# Patient Record
Sex: FEMALE | Race: WHITE | Employment: STUDENT | URBAN - METROPOLITAN AREA
[De-identification: names, ages, dates, MRNs, and addresses within clinical notes are randomized per-mention and may not be internally consistent; named-entity substitution may affect disease eponyms.]

---
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
## 2020-08-20 ENCOUNTER — LAB ENCOUNTER (OUTPATIENT)
Dept: LAB | Facility: HOSPITAL | Age: 20
End: 2020-08-20
Attending: FAMILY MEDICINE
Payer: COMMERCIAL

## 2020-08-20 ENCOUNTER — TELEMEDICINE (OUTPATIENT)
Dept: FAMILY MEDICINE CLINIC | Facility: CLINIC | Age: 20
End: 2020-08-20

## 2020-08-20 DIAGNOSIS — R06.02 SOB (SHORTNESS OF BREATH): ICD-10-CM

## 2020-08-20 DIAGNOSIS — Z20.822 CLOSE EXPOSURE TO COVID-19 VIRUS: Primary | ICD-10-CM

## 2020-08-20 DIAGNOSIS — R09.89 RUNNY NOSE: ICD-10-CM

## 2020-08-20 DIAGNOSIS — R51.9 HEADACHE AROUND THE EYES: ICD-10-CM

## 2020-08-20 DIAGNOSIS — R05.9 COUGH: ICD-10-CM

## 2020-08-20 DIAGNOSIS — Z20.822 CLOSE EXPOSURE TO COVID-19 VIRUS: ICD-10-CM

## 2020-08-20 PROCEDURE — 99214 OFFICE O/P EST MOD 30 MIN: CPT | Performed by: FAMILY MEDICINE

## 2020-08-20 NOTE — PROGRESS NOTES
Video Visit- Dorota   This is a telemedicine visit with live, interactive video and audio.      Yung Vicente understands and accepts financial responsibility for any deductible, co-insurance and/or co-pays as Future    2. Cough  - SARS-COV-2 BY PCR (); Future    3. SOB (shortness of breath)  - SARS-COV-2 BY PCR (); Future    4. Runny nose  - SARS-COV-2 BY PCR (); Future    5. Headache around the eyes  - SARS-COV-2 BY PCR ();  Future

## 2020-08-21 LAB — SARS-COV-2 RNA RESP QL NAA+PROBE: NOT DETECTED

## 2020-08-27 ENCOUNTER — VIRTUAL PHONE E/M (OUTPATIENT)
Dept: FAMILY MEDICINE CLINIC | Facility: CLINIC | Age: 20
End: 2020-08-27
Payer: COMMERCIAL

## 2020-08-27 DIAGNOSIS — R05.9 COUGH: Primary | ICD-10-CM

## 2020-08-27 DIAGNOSIS — Z20.822 CLOSE EXPOSURE TO COVID-19 VIRUS: ICD-10-CM

## 2020-08-27 PROCEDURE — 99213 OFFICE O/P EST LOW 20 MIN: CPT | Performed by: FAMILY MEDICINE

## 2020-08-27 NOTE — PROGRESS NOTES
Virtual Telephone Check-In    [place] verbally consents to a Virtual/Telephone Check-In visit on  08/27/20. Patient understands and accepts financial responsibility for any deductible, co-insurance and/or co-pays associated with this service.     Du There are limitations of this visit as physical examination was limited. Appropriate medical decision-making and tests are ordered as detailed in the plan of care above.

## 2020-12-21 ENCOUNTER — OFFICE VISIT (OUTPATIENT)
Dept: FAMILY MEDICINE CLINIC | Facility: CLINIC | Age: 20
End: 2020-12-21
Payer: COMMERCIAL

## 2020-12-21 VITALS
HEIGHT: 62 IN | SYSTOLIC BLOOD PRESSURE: 100 MMHG | WEIGHT: 181 LBS | RESPIRATION RATE: 16 BRPM | BODY MASS INDEX: 33.31 KG/M2 | OXYGEN SATURATION: 99 % | TEMPERATURE: 98 F | DIASTOLIC BLOOD PRESSURE: 60 MMHG | HEART RATE: 62 BPM

## 2020-12-21 DIAGNOSIS — Z28.21 INFLUENZA VACCINATION DECLINED BY PATIENT: ICD-10-CM

## 2020-12-21 DIAGNOSIS — Z79.899 MEDICATION MANAGEMENT: ICD-10-CM

## 2020-12-21 DIAGNOSIS — Z00.00 ROUTINE MEDICAL EXAM: Primary | ICD-10-CM

## 2020-12-21 DIAGNOSIS — I49.9 IRREGULAR HEART BEAT: ICD-10-CM

## 2020-12-21 DIAGNOSIS — N92.0 MENORRHAGIA WITH REGULAR CYCLE: ICD-10-CM

## 2020-12-21 DIAGNOSIS — N94.6 DYSMENORRHEA: ICD-10-CM

## 2020-12-21 PROCEDURE — 87591 N.GONORRHOEAE DNA AMP PROB: CPT | Performed by: FAMILY MEDICINE

## 2020-12-21 PROCEDURE — 3008F BODY MASS INDEX DOCD: CPT | Performed by: FAMILY MEDICINE

## 2020-12-21 PROCEDURE — 3078F DIAST BP <80 MM HG: CPT | Performed by: FAMILY MEDICINE

## 2020-12-21 PROCEDURE — 3074F SYST BP LT 130 MM HG: CPT | Performed by: FAMILY MEDICINE

## 2020-12-21 PROCEDURE — 87491 CHLMYD TRACH DNA AMP PROBE: CPT | Performed by: FAMILY MEDICINE

## 2020-12-21 PROCEDURE — 99214 OFFICE O/P EST MOD 30 MIN: CPT | Performed by: FAMILY MEDICINE

## 2020-12-21 PROCEDURE — 99395 PREV VISIT EST AGE 18-39: CPT | Performed by: FAMILY MEDICINE

## 2020-12-21 PROCEDURE — 93000 ELECTROCARDIOGRAM COMPLETE: CPT | Performed by: FAMILY MEDICINE

## 2020-12-21 RX ORDER — ACETAZOLAMIDE 125 MG/1
125 TABLET ORAL
COMMUNITY
Start: 2020-12-15

## 2020-12-21 RX ORDER — NABUMETONE 500 MG/1
1 TABLET, FILM COATED ORAL 2 TIMES DAILY
COMMUNITY
Start: 2020-12-11 | End: 2020-12-21

## 2020-12-21 RX ORDER — NORGESTIMATE AND ETHINYL ESTRADIOL 0.25-0.035
1 KIT ORAL DAILY
Qty: 84 TABLET | Refills: 3 | Status: SHIPPED | OUTPATIENT
Start: 2020-12-21

## 2020-12-21 NOTE — PROGRESS NOTES
Patient presents with:  Physical  Immunization/Injection: Influenza vaccine declined by patient today      HPI:  Patrick Laurent is a 21year old female here today for preventative visit. Imms- up to date with tdap, will discuss flu shot.  Afraid of needl SR 24 Hr, Take 1 capsule by mouth nightly.  , Disp: , Rfl: 1    •  Rizatriptan Benzoate 10 MG Oral Tab, 1 pill at migraine onset. Repeat in 2 hours if necessary. Limit 2 doses/day, 2 days/week. Do not take within 24 hrs from other triptans. , Disp: , Rfl: file        Emotionally abused: Not on file        Physically abused: Not on file        Forced sexual activity: Not on file    Other Topics      Concerns:        Not on file    Social History Narrative      Not on file    Family History   Problem Relation EKG for comparison. ASSESSMENT/PLAN:    1. Routine medical exam    2. Influenza vaccination declined by patient  - FLULAVAL INFLUENZA VACCINE QUAD PRESERVATIVE FREE 0.5 ML    3. Dysmenorrhea  - CHLAMYDIA/GONOCOCCUS, AVNI;  Future  - Norgestimate-Eth Est

## 2020-12-29 ENCOUNTER — TELEMEDICINE (OUTPATIENT)
Dept: FAMILY MEDICINE CLINIC | Facility: CLINIC | Age: 20
End: 2020-12-29
Payer: COMMERCIAL

## 2020-12-29 DIAGNOSIS — Z20.822 CLOSE EXPOSURE TO COVID-19 VIRUS: Primary | ICD-10-CM

## 2020-12-29 PROCEDURE — 99213 OFFICE O/P EST LOW 20 MIN: CPT | Performed by: FAMILY MEDICINE

## 2020-12-29 NOTE — PROGRESS NOTES
Video Visit- Dorota   This is a telemedicine visit with live, interactive video and audio.      Gi Leblanc understands and accepts financial responsibility for any deductible, co-insurance and/or co-pays as not/obese  MUSCULOSKELETAL: Sitting upright. NEUROLOGIC:  Cranial nerves 2-12 grossly intact. PSYCHIATRIC:  Normal mood, affect, and hygiene. Assessment/Plan:  1.  Close exposure to COVID-19 virus  - 2019 NOVEL CORONAVIRUS SARS-COV-2 BY PCR([facility]);

## 2020-12-30 ENCOUNTER — LAB ENCOUNTER (OUTPATIENT)
Dept: LAB | Age: 20
End: 2020-12-30
Attending: FAMILY MEDICINE
Payer: COMMERCIAL

## 2020-12-30 DIAGNOSIS — Z20.822 CLOSE EXPOSURE TO COVID-19 VIRUS: ICD-10-CM

## 2021-01-02 LAB — SARS-COV-2 BY PCR: NOT DETECTED

## 2021-01-07 ENCOUNTER — TELEMEDICINE (OUTPATIENT)
Dept: FAMILY MEDICINE CLINIC | Facility: CLINIC | Age: 21
End: 2021-01-07
Payer: COMMERCIAL

## 2021-01-07 DIAGNOSIS — Z20.822 CLOSE EXPOSURE TO COVID-19 VIRUS: Primary | ICD-10-CM

## 2021-01-07 PROCEDURE — 99212 OFFICE O/P EST SF 10 MIN: CPT | Performed by: FAMILY MEDICINE

## 2021-01-07 NOTE — PROGRESS NOTES
Phone Visit- Dorota Fraire  This is a telemedicine visit with live audio as pt couldn't hear me on the video call.      Shell Ashley understands and accepts financial responsibility for any deductible, co-insurance Speaking in full sentences comfortably, normal work of breathing  PSYCHIATRIC:  Normal mood, affect, and hygiene. Assessment/Plan:  1.  Close exposure to COVID-19 virus   -ok to end quarantine from her house, but will still need to stay away from her d

## 2021-03-13 DIAGNOSIS — Z23 NEED FOR VACCINATION: ICD-10-CM

## 2021-06-12 ENCOUNTER — HOSPITAL ENCOUNTER (EMERGENCY)
Age: 21
Discharge: HOME | End: 2021-06-12
Attending: EMERGENCY MEDICINE
Payer: COMMERCIAL

## 2021-06-12 VITALS
RESPIRATION RATE: 16 BRPM | WEIGHT: 170 LBS | HEART RATE: 82 BPM | TEMPERATURE: 98 F | SYSTOLIC BLOOD PRESSURE: 123 MMHG | OXYGEN SATURATION: 98 % | DIASTOLIC BLOOD PRESSURE: 82 MMHG | BODY MASS INDEX: 31.28 KG/M2 | HEIGHT: 62 IN

## 2021-06-12 DIAGNOSIS — L50.9 HIVES: Primary | ICD-10-CM

## 2021-06-12 PROCEDURE — 99283 EMERGENCY DEPT VISIT LOW MDM: CPT

## 2021-06-12 RX ORDER — PREDNISONE 20 MG/1
60 TABLET ORAL ONCE
Status: COMPLETED | OUTPATIENT
Start: 2021-06-12 | End: 2021-06-12

## 2021-06-12 RX ORDER — PREDNISONE 20 MG/1
40 TABLET ORAL DAILY
Qty: 10 TABLET | Refills: 0 | Status: SHIPPED | OUTPATIENT
Start: 2021-06-12 | End: 2021-06-17

## 2021-06-12 RX ORDER — EPINEPHRINE 0.3 MG/.3ML
0.3 INJECTION SUBCUTANEOUS
Qty: 1 EACH | Refills: 0 | Status: SHIPPED | OUTPATIENT
Start: 2021-06-12 | End: 2021-07-12

## 2021-06-12 NOTE — ED INITIAL ASSESSMENT (HPI)
Broke out in rash/hives 4 days ago, very itchy, taking Benadryl 50mg last dose 90 min ago, not helping, denies shortness of breath, no known allergens

## 2021-06-12 NOTE — ED PROVIDER NOTES
Patient Seen in: THE [facility] In [place]      History   Patient presents with:  Rash Skin Problem    Stated Complaint: RASH x4 DAYS.     HPI/Subjective:   HPI    41-year-old female presents for evaluation of itchy red rash for the past lower back, flexor creases of arms  Neurologic: No focal neurologic deficits. Normal speech pattern  Musculoskeletal: No tenderness or deformity noted.           ED Course   Labs Reviewed - No data to display              MDM      Patient will be given a p

## 2021-06-15 ENCOUNTER — OFFICE VISIT (OUTPATIENT)
Dept: FAMILY MEDICINE CLINIC | Facility: CLINIC | Age: 21
End: 2021-06-15
Payer: COMMERCIAL

## 2021-06-15 VITALS
DIASTOLIC BLOOD PRESSURE: 72 MMHG | BODY MASS INDEX: 31.28 KG/M2 | HEART RATE: 77 BPM | SYSTOLIC BLOOD PRESSURE: 122 MMHG | TEMPERATURE: 98 F | WEIGHT: 170 LBS | RESPIRATION RATE: 16 BRPM | OXYGEN SATURATION: 99 % | HEIGHT: 62 IN

## 2021-06-15 DIAGNOSIS — R09.89 THROAT TIGHTNESS: ICD-10-CM

## 2021-06-15 DIAGNOSIS — L50.9 HIVES: Primary | ICD-10-CM

## 2021-06-15 PROCEDURE — 3074F SYST BP LT 130 MM HG: CPT | Performed by: FAMILY MEDICINE

## 2021-06-15 PROCEDURE — 3008F BODY MASS INDEX DOCD: CPT | Performed by: FAMILY MEDICINE

## 2021-06-15 PROCEDURE — 3078F DIAST BP <80 MM HG: CPT | Performed by: FAMILY MEDICINE

## 2021-06-15 PROCEDURE — 99214 OFFICE O/P EST MOD 30 MIN: CPT | Performed by: FAMILY MEDICINE

## 2021-06-15 RX ORDER — GALCANEZUMAB 120 MG/ML
120 INJECTION, SOLUTION SUBCUTANEOUS
COMMUNITY
Start: 2021-06-10

## 2021-06-15 RX ORDER — NABUMETONE 750 MG/1
TABLET, FILM COATED ORAL
COMMUNITY
Start: 2021-03-18 | End: 2021-06-15 | Stop reason: ALTCHOICE

## 2021-06-15 RX ORDER — DIPHENHYDRAMINE HCL 25 MG
75 TABLET ORAL
COMMUNITY

## 2021-06-15 RX ORDER — PREDNISONE 20 MG/1
TABLET ORAL
Qty: 32 TABLET | Refills: 0 | Status: SHIPPED | OUTPATIENT
Start: 2021-06-15 | End: 2021-06-29

## 2021-06-15 NOTE — PROGRESS NOTES
[facility] Visit Note  6/15/2021      Subjective:      Patient ID: Shell Ashley is a 24year old female.     Chief Complaint:  Patient presents with:  Rash: breaking out itchy red hive like rash all over her body since Wed last week, went to the E [facility]ives  - ALLERGY - INTERNAL  - predniSONE 20 MG Oral Tab; Take 3 tablets (60 mg total) by mouth daily for 7 days, THEN 2 tablets (40 mg total) daily for 4 days, THEN 1 tablet (20 mg total) daily for 3 days. Dispense: 32 tablet;  Refill: 0  - triamcinolone

## 2022-05-29 NOTE — ED AVS SNAPSHOT
Roz Friedman   MRN: [id number]    Department:  [facility] in [place]   Date of Visit:  7/7/2018           Disclosure     Insurance plans vary and the physician(s) referred by the ER may not be covered by your plan.  Please contact yo tell this physician (or your personal doctor if your instructions are to return to your personal doctor) about any new or lasting problems. The primary care or specialist physician will see patients referred from the [facility] Emergency Department.  Arthor Bernheim No

## 2023-01-26 ENCOUNTER — OFFICE VISIT (OUTPATIENT)
Dept: FAMILY MEDICINE CLINIC | Facility: CLINIC | Age: 23
End: 2023-01-26
Payer: COMMERCIAL

## 2023-01-26 VITALS
RESPIRATION RATE: 16 BRPM | OXYGEN SATURATION: 98 % | WEIGHT: 145 LBS | DIASTOLIC BLOOD PRESSURE: 70 MMHG | SYSTOLIC BLOOD PRESSURE: 112 MMHG | TEMPERATURE: 98 F | BODY MASS INDEX: 26.68 KG/M2 | HEIGHT: 62 IN | HEART RATE: 74 BPM

## 2023-01-26 DIAGNOSIS — N63.11 BREAST LUMP ON RIGHT SIDE AT 11 O'CLOCK POSITION: Primary | ICD-10-CM

## 2023-01-26 DIAGNOSIS — J06.9 VIRAL URI: ICD-10-CM

## 2023-01-26 DIAGNOSIS — N63.13 BREAST LUMP ON RIGHT SIDE AT 7 O'CLOCK POSITION: ICD-10-CM

## 2023-01-26 PROCEDURE — 3074F SYST BP LT 130 MM HG: CPT | Performed by: FAMILY MEDICINE

## 2023-01-26 PROCEDURE — 99213 OFFICE O/P EST LOW 20 MIN: CPT | Performed by: FAMILY MEDICINE

## 2023-01-26 PROCEDURE — 3078F DIAST BP <80 MM HG: CPT | Performed by: FAMILY MEDICINE

## 2023-01-26 PROCEDURE — 3008F BODY MASS INDEX DOCD: CPT | Performed by: FAMILY MEDICINE

## 2023-04-21 ENCOUNTER — HOSPITAL ENCOUNTER (OUTPATIENT)
Dept: ULTRASOUND IMAGING | Age: 23
Discharge: HOME | End: 2023-04-21
Attending: FAMILY MEDICINE
Payer: COMMERCIAL

## 2023-04-21 ENCOUNTER — HOSPITAL ENCOUNTER (OUTPATIENT)
Dept: MAMMOGRAPHY | Age: 23
Discharge: HOME | End: 2023-04-21
Attending: FAMILY MEDICINE
Payer: COMMERCIAL

## 2023-04-21 DIAGNOSIS — N63.13 BREAST LUMP ON RIGHT SIDE AT 7 O'CLOCK POSITION: ICD-10-CM

## 2023-04-21 DIAGNOSIS — N63.11 BREAST LUMP ON RIGHT SIDE AT 11 O'CLOCK POSITION: ICD-10-CM

## 2023-04-21 PROCEDURE — 77061 BREAST TOMOSYNTHESIS UNI: CPT | Performed by: FAMILY MEDICINE

## 2023-04-21 PROCEDURE — 76642 ULTRASOUND BREAST LIMITED: CPT | Performed by: FAMILY MEDICINE

## 2023-04-21 PROCEDURE — 77065 DX MAMMO INCL CAD UNI: CPT | Performed by: FAMILY MEDICINE

## 2023-04-25 DIAGNOSIS — N63.10 MASS OF RIGHT BREAST, UNSPECIFIED QUADRANT: Primary | ICD-10-CM

## 2023-09-21 ENCOUNTER — E-VISIT (OUTPATIENT)
Dept: TELEHEALTH | Age: 23
End: 2023-09-21
Payer: COMMERCIAL

## 2023-09-21 DIAGNOSIS — J06.9 VIRAL URI: Primary | ICD-10-CM

## 2023-09-21 DIAGNOSIS — Z02.89 ENCOUNTER TO OBTAIN EXCUSE FROM WORK: ICD-10-CM

## 2023-09-21 PROCEDURE — 99422 OL DIG E/M SVC 11-20 MIN: CPT | Performed by: PHYSICIAN ASSISTANT

## 2023-09-21 NOTE — PROGRESS NOTES
Allie Zarate is a 21year old female who initiated e-visit care today. HPI:   See answers to questionnaire submission     Current Outpatient Medications   Medication Sig Dispense Refill    EMGALITY 120 MG/ML Subcutaneous Solution Auto-injector Inject 120 mg into the skin every 30 (thirty) days. diphenhydrAMINE HCl 25 MG Oral Tab Take 75 mg by mouth. EVERY 3-4 HOURS      acetaZOLAMIDE 125 MG Oral Tab Take 125 mg by mouth. AS NEEDED BEFORE WEATHER CHANGES      Diclofenac Potassium,Migraine, 50 MG Oral Powd Pack Take 1 packet at migraine onset. Limit 1 dose/day, 2 days/week. UBRELVY 50 MG Oral Tab as needed. Rizatriptan Benzoate 10 MG Oral Tab 1 pill at migraine onset. Repeat in 2 hours if necessary. Limit 2 doses/day, 2 days/week. Do not take within 24 hrs from other triptans. baclofen 10 MG Oral Tab Take 10 mg by mouth as needed. AS needed BID        Past Medical History:   Diagnosis Date    Dysmenorrhea in adolescent     Fracture of phalanx of left index finger     Migraines     Dr. Joey Linares      Past Surgical History:   Procedure Laterality Date    WISDOM TEETH REMOVED  2017      Family History   Problem Relation Age of Onset    Bipolar Disorder Other     Depression Other     Schizophrenia Other     Anxiety Mother     Depression Mother     Anxiety Maternal Grandmother     Migraines Maternal Grandmother     ADHD Brother     Hypertension Father     High Cholesterol Father     Anxiety Sister     Depression Sister     Hypertension Paternal Grandfather     High Cholesterol Paternal Grandfather       Social History:  Social History     Socioeconomic History    Marital status: Single   Tobacco Use    Smoking status: Never     Passive exposure: Never    Smokeless tobacco: Never   Vaping Use    Vaping Use: Never used   Substance and Sexual Activity    Alcohol use: Yes     Alcohol/week: 0.0 standard drinks of alcohol     Comment: 2-3x/mo 2 cups wine, never a problem    Drug use:  Yes Types: Cannabis     Comment: 2x/mo    Sexual activity: Yes         ASSESSMENT AND PLAN:       Diagnoses and all orders for this visit:    Viral URI    Encounter to obtain excuse from work           Duration of  the service:  14 minutes      See Fonality message exchange and Patient Instructions for Comfort Care and patient education.

## 2024-01-19 ENCOUNTER — OFFICE VISIT (OUTPATIENT)
Dept: FAMILY MEDICINE CLINIC | Facility: CLINIC | Age: 24
End: 2024-01-19
Payer: COMMERCIAL

## 2024-01-19 VITALS
BODY MASS INDEX: 25.58 KG/M2 | RESPIRATION RATE: 16 BRPM | TEMPERATURE: 98 F | WEIGHT: 139 LBS | HEIGHT: 62 IN | DIASTOLIC BLOOD PRESSURE: 60 MMHG | SYSTOLIC BLOOD PRESSURE: 110 MMHG | HEART RATE: 80 BPM | OXYGEN SATURATION: 99 %

## 2024-01-19 DIAGNOSIS — Z00.00 ROUTINE MEDICAL EXAM: Primary | ICD-10-CM

## 2024-01-19 DIAGNOSIS — Z23 NEED FOR VACCINATION: ICD-10-CM

## 2024-01-19 DIAGNOSIS — Z12.4 CERVICAL CANCER SCREENING: ICD-10-CM

## 2024-01-19 DIAGNOSIS — Z30.09 CONTRACEPTIVE EDUCATION: ICD-10-CM

## 2024-01-19 DIAGNOSIS — Z28.21 INFLUENZA VACCINATION DECLINED BY PATIENT: ICD-10-CM

## 2024-01-19 DIAGNOSIS — N63.10 MASS OF RIGHT BREAST, UNSPECIFIED QUADRANT: ICD-10-CM

## 2024-01-19 PROCEDURE — 3074F SYST BP LT 130 MM HG: CPT | Performed by: FAMILY MEDICINE

## 2024-01-19 PROCEDURE — 90715 TDAP VACCINE 7 YRS/> IM: CPT | Performed by: FAMILY MEDICINE

## 2024-01-19 PROCEDURE — 3078F DIAST BP <80 MM HG: CPT | Performed by: FAMILY MEDICINE

## 2024-01-19 PROCEDURE — 3008F BODY MASS INDEX DOCD: CPT | Performed by: FAMILY MEDICINE

## 2024-01-19 PROCEDURE — 90471 IMMUNIZATION ADMIN: CPT | Performed by: FAMILY MEDICINE

## 2024-01-19 PROCEDURE — 99395 PREV VISIT EST AGE 18-39: CPT | Performed by: FAMILY MEDICINE

## 2024-01-19 RX ORDER — DIHYDROERGOTAMINE MESYLATE 4 MG/ML
SPRAY, METERED NASAL
COMMUNITY

## 2024-01-19 NOTE — PROGRESS NOTES
Chief Complaint   Patient presents with    Physical     Declined the influenza vaccine today.    Contraception     Wants to discuss options for birth control.    Abdominal Pain     States she's been  having \"discomfort\" in her left lower abdomen since last Wed.       HPI:  Nara Mcclain is a 23 year old female here today for preventative visit.       Imms- up to date with HPV x 3. Will discuss flu, covid, & tdap. Afraid of needles.        Cervical cancer screening- Never had a pap yet.        Breast cancer screening- No known family history of breast/ovarian cancer.  The appearance is consistent with a complicated sebaceous cyst.  Recommend clinical correlation and possible dermatologic consultation.  Follow-up right breast ultrasound in 3 months is recommended to assess for stability.  No abnormality is   seen to correspond to reported palpable lump at 1100 hours.     Was due to repeat 7/25/23        Colon cancer screening- No early family h/o colon cancer.           Diet/exercise- working on this.Lost 30# ovr the last 3yrs.        Dental/Eye Check up-  Recommended pt see dentist once every 6 months for a cleaning and once every year for an eye exam.        Migraines- Dr. French, neuro used to be part of [facility].    OCP- options discussion    LLQ abd pain-  having \"discomfort\" in her left lower abdomen since last Wed.      Past Medical History:   Diagnosis Date    Anxiety     Depression     Dysmenorrhea in adolescent     Fracture of phalanx of left index finger     Migraines     Dr. Padgett, [facility]     Past Surgical History:   Procedure Laterality Date    WISDOM TEETH REMOVED  2017     Current Outpatient Medications on File Prior to Visit   Medication Sig Dispense Refill    Dihydroergotamine Mesylate HFA (TRUDHESA) 0.725 MG/ACT Nasal Aero Soln SPRAY 1 SPRAY (0.725 MG) INTO EACH NOSTRIL BY INTRANASAL ROUTE ONCE MAY REPEAT IN 1 HOUR IF NEEDED MAX OF 2 DOSES/24 HRS OR 3 DOSES/WEEK      EMGALITY 120  MG/ML Subcutaneous Solution Auto-injector Inject 120 mg into the skin every 30 (thirty) days.      Diclofenac Potassium,Migraine, 50 MG Oral Powd Pack Take 1 packet at migraine onset. Limit 1 dose/day, 2 days/week.      UBRELVY 50 MG Oral Tab as needed.        Rizatriptan Benzoate 10 MG Oral Tab 1 pill at migraine onset. Repeat in 2 hours if necessary. Limit 2 doses/day, 2 days/week. Do not take within 24 hrs from other triptans.      baclofen 10 MG Oral Tab Take 1 tablet (10 mg total) by mouth as needed. AS needed BID       No current facility-administered medications on file prior to visit.     No Known Allergies  Social History     Socioeconomic History    Marital status: Single     Spouse name: Not on file    Number of children: Not on file    Years of education: Not on file    Highest education level: Not on file   Occupational History    Not on file   Tobacco Use    Smoking status: Never     Passive exposure: Never    Smokeless tobacco: Never   Vaping Use    Vaping Use: Every day    Substances: THC, cannibis4-7d/wk. for migraine control    Devices: Pre-filled or refillable cartridge   Substance and Sexual Activity    Alcohol use: Yes     Comment: 1-2x/mo 5shots, never a problem    Drug use: Yes     Types: Cannabis     Comment: 4-7d/wk for migraines    Sexual activity: Yes     Partners: Female, Male     Birth control/protection: Condom, Rhythm   Other Topics Concern    Caffeine Concern No    Exercise No    Seat Belt No    Special Diet No    Stress Concern No    Weight Concern No   Social History Narrative    Not on file     Social Determinants of Health     Financial Resource Strain: Not on file   Food Insecurity: Not on file   Transportation Needs: Not on file   Physical Activity: Not on file   Stress: Not on file   Social Connections: Not on file   Housing Stability: Not on file     Family History   Problem Relation Age of Onset    Anxiety Mother     Depression Mother     Hypertension Father     High  Cholesterol Father     Anxiety Sister     Depression Sister     ADHD Brother     Anxiety Maternal Grandmother     Migraines Maternal Grandmother     No Known Problems Maternal Grandfather     No Known Problems Paternal Grandmother     Hypertension Paternal Grandfather     High Cholesterol Paternal Grandfather     Bipolar Disorder Other     Depression Other     Schizophrenia Other        Review of Systems - All systems reviewed and negative except for HPI    PHYSICAL EXAM:  /60   Pulse 80   Temp 98.3 °F (36.8 °C) (Temporal)   Resp 16   Ht 5' 2\" (1.575 m)   Wt 139 lb (63 kg)   LMP 12/25/2023 (Exact Date)   SpO2 99%   BMI 25.42 kg/m²   GENERAL APPEARANCE:  Alert, no acute distress, appears stated age  HEENT:  Head- Normocephalic, atraumatic.    Eyes- Extraocular movements intact, pupils equally round and reactive to light,  conjunctivae normal.    Ears- Tympanic membranes intact bilaterally.    Nose- Patent, normal septum and turbinates.    Mouth/Throat- Normal oral mucosa, throat non-erythematous.  NECK:  No submental, submandibular, ant/post cervical lymphadenopathy. No thyromegaly or masses.  PULMONARY:  Lungs clear to auscultation bilaterally. No wheezes, rales, or rhonchi. Normal respiratory effort.  CARDIOVASCULAR:  Regular rate and rhythm. No murmurs, gallops, or rubs.  ABDOMEN:  + bowel sounds, soft, nontender, nondistended. No hepatomegaly.  MUSCULOSKELETAL: Strength of upper and lower extremities 5/5 bilaterally. Normal gait.  NEUROLOGIC:  Cranial nerves 2-12 grossly intact.  PSYCHIATRIC:  Normal mood, affect, and hygiene.     ASSESSMENT/PLAN:    1. Routine medical exam    2. Cervical cancer screening    3. Influenza vaccination declined by patient  - Fluzone Quadrivalent 6mo+ 0.5mL    4. Need for vaccination  - Immunization Admin Counseling, 1st Component, 18 years and older  - Immunization Admin Counseling, Additional Component, 18 years and older  - TdaP (Boostrix/Adacel) Vaccine (> 7  Y)    5. Contraceptive education  - OBG Referral - [facility])    6. Mass of right breast, unspecified quadrant  -gave order for breast US f/u.       Patient verbalized understanding and agrees to plan.      Return in about 1 year (around 1/19/2025) for annual physical, we will contact you with results of breast US,.

## 2024-02-05 ENCOUNTER — HOSPITAL ENCOUNTER (OUTPATIENT)
Dept: ULTRASOUND IMAGING | Age: 24
Discharge: HOME | End: 2024-02-05
Attending: FAMILY MEDICINE
Payer: COMMERCIAL

## 2024-02-05 DIAGNOSIS — N63.10 MASS OF RIGHT BREAST, UNSPECIFIED QUADRANT: ICD-10-CM

## 2024-02-05 PROCEDURE — 76642 ULTRASOUND BREAST LIMITED: CPT | Performed by: FAMILY MEDICINE

## 2024-02-10 DIAGNOSIS — N63.10 MASS OF RIGHT BREAST, UNSPECIFIED QUADRANT: Primary | ICD-10-CM

## 2024-03-02 ENCOUNTER — APPOINTMENT (OUTPATIENT)
Dept: GENERAL RADIOLOGY | Age: 24
End: 2024-03-02
Attending: EMERGENCY MEDICINE
Payer: COMMERCIAL

## 2024-03-02 ENCOUNTER — HOSPITAL ENCOUNTER (EMERGENCY)
Age: 24
Discharge: HOME | End: 2024-03-02
Attending: EMERGENCY MEDICINE
Payer: COMMERCIAL

## 2024-03-02 VITALS
WEIGHT: 140 LBS | HEART RATE: 70 BPM | TEMPERATURE: 99 F | HEIGHT: 62 IN | DIASTOLIC BLOOD PRESSURE: 82 MMHG | RESPIRATION RATE: 12 BRPM | SYSTOLIC BLOOD PRESSURE: 132 MMHG | BODY MASS INDEX: 25.76 KG/M2 | OXYGEN SATURATION: 99 %

## 2024-03-02 DIAGNOSIS — S69.92XA INJURY OF FINGER OF LEFT HAND, INITIAL ENCOUNTER: Primary | ICD-10-CM

## 2024-03-02 PROCEDURE — 99283 EMERGENCY DEPT VISIT LOW MDM: CPT

## 2024-03-02 PROCEDURE — 73140 X-RAY EXAM OF FINGER(S): CPT | Performed by: EMERGENCY MEDICINE

## 2024-03-02 PROCEDURE — 99284 EMERGENCY DEPT VISIT MOD MDM: CPT

## 2024-03-02 NOTE — DISCHARGE INSTRUCTIONS
Tylenol or ibuprofen for pain as needed.  Activity as tolerated.  Follow-up with orthopedics, follow group above as needed for persistent symptoms.

## 2024-03-02 NOTE — ED INITIAL ASSESSMENT (HPI)
Reports third digit left hand pain since wed.  Denies injury.  States she thinks she dislocated it.  Pt able to make a fist and stretch out fingers without difficulty.  Last med for pain was yesterday

## 2024-03-02 NOTE — ED PROVIDER NOTES
Patient Seen in: [facility] [place]      History     Chief Complaint   Patient presents with    Arm or Hand Injury     Stated Complaint: left hand middle finger swelling since wednesday. unknown injury    Subjective:   HPI    Patient is a 23-year-old female presenting for evaluation of left middle finger pain for the last 4 days.  Based on the palmar side of her hand over the MCP joint.  Pain with pressure over there although not so much with range of motion.  No injury or trauma that she recalls.    Objective:   Past Medical History:   Diagnosis Date    Anxiety     Depression     Dysmenorrhea in adolescent     Fracture of phalanx of left index finger     Migraines     Dr. Padgett, [facility]              Past Surgical History:   Procedure Laterality Date    WISDOM TEETH REMOVED  2017                Social History     Socioeconomic History    Marital status: Single   Tobacco Use    Smoking status: Never     Passive exposure: Never    Smokeless tobacco: Never   Vaping Use    Vaping Use: Every day    Substances: THC, cannibis4-7d/wk. for migraine control    Devices: Pre-filled or refillable cartridge   Substance and Sexual Activity    Alcohol use: Yes     Comment: 1-2x/mo 5shots, never a problem    Drug use: Yes     Types: Cannabis     Comment: 4-7d/wk for migraines    Sexual activity: Yes     Partners: Female, Male     Birth control/protection: Condom, Rhythm   Other Topics Concern    Caffeine Concern No    Exercise No    Seat Belt No    Special Diet No    Stress Concern No    Weight Concern No              Review of Systems    Positive for stated complaint: left hand middle finger swelling since wednesday. unknown injury  Other systems are as noted in HPI.  Constitutional and vital signs reviewed.      All other systems reviewed and negative except as noted above.    Physical Exam     ED Triage Vitals [03/02/24 1143]   /82   Pulse 70   Resp 12   Temp 98.8 °F (37.1 °C)   Temp src  Temporal   SpO2 99 %   O2 Device None (Room air)       Current:/82   Pulse 70   Temp 98.8 °F (37.1 °C) (Temporal)   Resp 12   Ht 157.5 cm (5' 2\")   Wt 63.5 kg   LMP 02/19/2024 (Exact Date)   SpO2 99%   BMI 25.61 kg/m²         Physical Exam  Vitals and nursing note reviewed.   Constitutional:       Appearance: She is well-developed.   HENT:      Head: Normocephalic and atraumatic.   Cardiovascular:      Rate and Rhythm: Normal rate and regular rhythm.      Heart sounds: Normal heart sounds.   Pulmonary:      Effort: Pulmonary effort is normal.      Breath sounds: Normal breath sounds.   Musculoskeletal:         General: Normal range of motion.      Comments: Mild tenderness to palpation basically over the very proximal end of the proximal phalanx of the middle finger left hand, really just over the palmar side and a little bit around the MCP joint.  There is no erythema or increased warmth.  There is no soft tissue swelling or obvious deformity.  Active range of motion is good.   Skin:     General: Skin is warm and dry.   Neurological:      Mental Status: She is alert and oriented to person, place, and time.               ED Course   Labs Reviewed - No data to display          XR FINGER(S) (MIN 2 VIEWS), LEFT 3RD (CPT=73140)    Result Date: 3/2/2024  PROCEDURE:  XR FINGER(S) (MIN 2 VIEWS), LEFT 3RD (CPT=73140)  INDICATIONS:  left hand middle finger swelling since wednesday. unknown injury  COMPARISON:  None.  TECHNIQUE:  Three views of the finger were obtained.  PATIENT STATED HISTORY: (As transcribed by Technologist)  Pt stated left 3rd finger discomfort statting on 2/28/24. She denies any injury    FINDINGS:  Osseous structures are intact. Joint spaces are preserved. No dislocation.            CONCLUSION:  Unremarkable 3rd digit radiographs.  See above description.  Continued clinical correlation recommended.    LOCATION:  [facility]   Dictated by (CST): Kennedi Sanchez MD on 3/02/2024 at 12:09 PM      Finalized by (CST): Kennedi Sanchez MD on 3/02/2024 at 12:09 PM       US BREAST RIGHT LIMITED (CPT=76642)    Result Date: 2/5/2024  PROCEDURE:  US BREAST RIGHT LIMITED (CPT=76642)  COMPARISON:  GEORGES, MG, [facility] SEGUNDO 2D+3D DIAGNOSTIC [facility] RIGHT (MBW=[id number]), 4/21/2023, 2:30 PM.  GEORGES, US, US BREAST RIGHT LIMITED ([facility] SAME DAY US)(CPT=76642), 4/21/2023, 2:03 PM.  INDICATIONS:  N63.10 Mass of right breast, unspecified quadrant  TECHNIQUE:  Right breast ultrasound was performed, evaluating specific areas of concern.  The breast was NOT scanned in entirety.     FINDINGS:  Ultrasound again demonstrates a very superficial echogenic nodule likely related to the skin/sub dermis at the 0700 hours retroareolar position.  It measures 0.3 x 0.2 x 0.2 cm, prior 0.4 x 0.3 x 0.2 cm.  No new sonographic abnormality.  Recommend 6 month follow-up ultrasound for continued assessment.  Findings and recommendations were discussed with the patient.             CONCLUSION:   BI-RADS CATEGORY: DIAGNOSTIC CATEGORY 3--PROBABLY BENIGN FINDING.   RECOMMENDATIONS:  SHORT TERM FOLLOW-UP ULTRASOUND RIGHT BREAST IN 6 MONTHS.   LOCATION:  [place]     Dictated by (CST): Oscar Quintana MD on 2/05/2024 at 10:01 AM     Finalized by (CST): Oscar Quintana MD on 2/05/2024 at 10:06 AM               MDM      23-year-old female presenting for evaluation of left middle finger pain, really on the proximal part of the proximal phalanx and the MCP joint.  Differential includes fracture, muscle strain, tendon injury.  No obvious trauma that she recalls.  Will get an x-ray to rule out bony abnormality.      Update at 12:40 PM.  X-rays negative for bony injury or fracture.  Discussed results with patient.  Follow-up with hand surgery is reasonable.  She declines a splint here.        Past Medical History-none    Differential diagnosis before testing included fracture, dislocation, muscle injury    Co-morbidities that add to the complexity of management include:  None    Testing ordered during this visit included x-ray    Radiographic images  I personally reviewed the radiographs and my individual interpretation shows no fracture or dislocation  I also reviewed the official reports that showed no fracture or dislocation            Disposition:          Discharge  I have discussed with the patient the results of test, differential diagnosis, treatment plan, warning signs and symptoms which should prompt immediate return.  They expressed understanding of these instructions and agrees to the following plan provided.  They were given written discharge instructions and agrees to return for any concerns and voiced understanding and all questions were answered.                           Medical Decision Making      Disposition and Plan     Clinical Impression:  1. Injury of finger of left hand, initial encounter         Disposition:  Discharge  3/2/2024 12:51 pm    Follow-up:  [facility]oped[facility]  [address]  [address] [address] [phone]  Follow up  As needed          Medications Prescribed:  Current Discharge Medication List

## 2024-03-06 ENCOUNTER — OFFICE VISIT (OUTPATIENT)
Dept: OBGYN CLINIC | Facility: CLINIC | Age: 24
End: 2024-03-06
Payer: COMMERCIAL

## 2024-03-06 VITALS
DIASTOLIC BLOOD PRESSURE: 62 MMHG | HEIGHT: 62 IN | HEART RATE: 83 BPM | WEIGHT: 140.19 LBS | BODY MASS INDEX: 25.8 KG/M2 | SYSTOLIC BLOOD PRESSURE: 108 MMHG

## 2024-03-06 DIAGNOSIS — Z01.419 WELL WOMAN EXAM WITH ROUTINE GYNECOLOGICAL EXAM: Primary | ICD-10-CM

## 2024-03-06 DIAGNOSIS — Z30.09 ENCOUNTER FOR COUNSELING REGARDING CONTRACEPTION: ICD-10-CM

## 2024-03-06 DIAGNOSIS — Z12.4 SCREENING FOR CERVICAL CANCER: ICD-10-CM

## 2024-03-06 DIAGNOSIS — Z11.3 SCREEN FOR STD (SEXUALLY TRANSMITTED DISEASE): ICD-10-CM

## 2024-03-06 PROCEDURE — 3078F DIAST BP <80 MM HG: CPT | Performed by: NURSE PRACTITIONER

## 2024-03-06 PROCEDURE — 87491 CHLMYD TRACH DNA AMP PROBE: CPT | Performed by: NURSE PRACTITIONER

## 2024-03-06 PROCEDURE — 3008F BODY MASS INDEX DOCD: CPT | Performed by: NURSE PRACTITIONER

## 2024-03-06 PROCEDURE — 99385 PREV VISIT NEW AGE 18-39: CPT | Performed by: NURSE PRACTITIONER

## 2024-03-06 PROCEDURE — 87591 N.GONORRHOEAE DNA AMP PROB: CPT | Performed by: NURSE PRACTITIONER

## 2024-03-06 PROCEDURE — 3074F SYST BP LT 130 MM HG: CPT | Performed by: NURSE PRACTITIONER

## 2024-03-06 NOTE — PROGRESS NOTES
Subjective:  Chief Complaint   Patient presents with    Physical     Annual      23 year old female  presents for annual.    Pt denies complaints  Looking for contraceptive    Patient's last menstrual period was 2024 (exact date).  Pap Result Notes: no pap yet  Menarche: 13 (3/6/2024  2:31 PM)  Period Cycle (Days): 28-30 days (3/6/2024  2:31 PM)  Period Duration (Days): 5-7 days (3/6/2024  2:31 PM)  Period Flow: varies (3/6/2024  2:31 PM)  Use of Birth Control (if yes, specify type): Condoms (3/6/2024  2:31 PM)  Pap Result Notes: no pap yet (3/6/2024  2:31 PM)      Last Pap smear: none       Pelvic Infections/STD: None  Sexually active with both males and females, currently only male partner at this time  Contraception: condoms    Denies family history of breast, ovarian and colon CA.    Feeling safe at home.    Most Recent Immunizations   Administered Date(s) Administered    Covid-19 Vaccine Pfizer 30 mcg/0.3 ml 2021    DTAP 2005    DTAP INFANRIX 2001    HEP A 2014    HEP B, Ped/Adol 2001    HIB 2001    Hpv Virus Vaccine 9 Marley Im 2018    IPV 2005    MMR 2005    Meningococcal-Menactra 2016    Pneumococcal (Prevnar 13) 2015    Pneumococcal (Prevnar 7) 2001    TDAP 2024    Varicella 2007   Deferred Date(s) Deferred    FLULAVAL 6 months & older 0.5 ml Prefilled syringe (85877) 2020    FLUZONE 6 months and older PFS 0.5 ml (12980) 2024      reports that she has never smoked. She has never been exposed to tobacco smoke. She has never used smokeless tobacco.   reports current alcohol use.    Past Medical History:   Diagnosis Date    Anxiety     Depression     Dysmenorrhea in adolescent     Fracture of phalanx of left index finger     Migraines     Dr. Padgett, [facility]     Past Surgical History:   Procedure Laterality Date    WISDOM TEETH REMOVED         Review of Systems:  Pertinent items are noted in the  HPI.    Objective:  /62   Pulse 83   Ht 62\"   Wt 140 lb 3.2 oz (63.6 kg)   LMP 02/19/2024 (Exact Date)   BMI 25.64 kg/m²    Physical Examination:  General appearance: Well dressed, well nourished in no apparent distress  Neurologic/Psychiatric: Alert and oriented to person, place and time, mood normal, affect appropriate  Head: Normocephalic without obvious deformity, atraumatic  Neck: No thyromegaly, supple, non-tender, no masses, no adenopathy  Lungs: Clear to auscultation bilaterally, no rales, wheezes or rhonchi  Breasts: Symmetric, non-tender, no masses, lesions, retraction, dimpling or discharge bilaterally, no axillary or supraclavicular lymphadenopathy  Heart: Regular rate and rhythm, no gallops or murmurs  Abdomen: Soft, non-tender, non-distended, no masses, no hepatosplenomegaly, no hernias, no inguinal lymphadenopathy  Pelvic:    External genitalia- Normal, Bartholin's, urethra, skeins glands normal   Vagina- No vaginal lesions, physiologic discharge   Cervix- No lesions, long/closed, no cervical motion tenderness   Uterus- Normal sized, non-tender, no masses   Adnexa-  Non-tender, no masses  Extremities: Non-tender, full range of motion, no clubbing, cyanosis or edema  Skin:  General inspection- no rashes, lesions or discoloration  Genital cultures collected  Pap smear done    Assessment/Plan:  Normal well-woman exam.  STD screen  Pap smear obtained.      Diagnoses and all orders for this visit:    Well woman exam with routine gynecological exam  - self breast exam discussed and encouraged    Screening for cervical cancer  -     ThinPrep Pap with HPV Reflex, Chlamydia/GC; Future    Screen for STD (sexually transmitted disease)  -     Chlamydia/Gc Amplification; Future    Encounter for counseling regarding contraception  - discussed contraceptive options including IUD, implant, pill, patch, ring, injection  - pt desires implaint  - r/b/a discussed  - pamphlet given  - to follow up for  placement      Return for Nexplanon insertion.

## 2024-03-07 LAB
C TRACH DNA SPEC QL NAA+PROBE: NEGATIVE
N GONORRHOEA DNA SPEC QL NAA+PROBE: NEGATIVE

## 2024-03-09 ENCOUNTER — OFFICE VISIT (OUTPATIENT)
Dept: FAMILY MEDICINE CLINIC | Facility: CLINIC | Age: 24
End: 2024-03-09
Payer: COMMERCIAL

## 2024-03-09 VITALS
RESPIRATION RATE: 16 BRPM | HEIGHT: 62 IN | TEMPERATURE: 98 F | SYSTOLIC BLOOD PRESSURE: 100 MMHG | OXYGEN SATURATION: 98 % | BODY MASS INDEX: 25.58 KG/M2 | WEIGHT: 139 LBS | HEART RATE: 60 BPM | DIASTOLIC BLOOD PRESSURE: 60 MMHG

## 2024-03-09 DIAGNOSIS — M67.449 GANGLION CYST OF FINGER: Primary | ICD-10-CM

## 2024-03-09 PROCEDURE — 3074F SYST BP LT 130 MM HG: CPT | Performed by: FAMILY MEDICINE

## 2024-03-09 PROCEDURE — 3078F DIAST BP <80 MM HG: CPT | Performed by: FAMILY MEDICINE

## 2024-03-09 PROCEDURE — 3008F BODY MASS INDEX DOCD: CPT | Performed by: FAMILY MEDICINE

## 2024-03-09 PROCEDURE — 99213 OFFICE O/P EST LOW 20 MIN: CPT | Performed by: FAMILY MEDICINE

## 2024-03-09 NOTE — PROGRESS NOTES
[facility] Visit Note  3/9/2024      Subjective:      Patient ID: Nara Mcclain is a 23 year old female.    Chief Complaint:  Chief Complaint   Patient presents with    ER F/U     Was treated on 3/2/24 for swelling at the base of her left hand middle finger.today staes the swelling is gone, but still gets shooting pain to certain touches.       HPI:  Nara Mcclain is a 23 year old female who is being seen today for the above.      Lump of finger- Was tapping the base of her finger with a pencil and   Pain resolved, but if hits the area still gets sharp shooting pain. Swelling lessened.   Pain was a 6/10 and now 2/10.   Is L handed.      X-ray 3rd L finger- Unremarkable 3rd digit radiographs.  See above description.  Continued clinical correlation recommended.            Review of Systems - as stated above in the HPI      Objective:     Vitals:    03/09/24 1021   BP: 100/60   Pulse: 60   Resp: 16   Temp: 98.2 °F (36.8 °C)   TempSrc: Temporal   SpO2: 98%   Weight: 139 lb (63 kg)   Height: 5' 2\" (1.575 m)       Physical Examination   General:  Alert, in no acute distress  HEENT: NCAT, EOMI, mucus membranes moist   Neck:  No cervical lymphadenopathy  CV: Regular rate and rhythm. No murmurs, gallops, or rubs.  Lungs:  Clear to auscultation B, no wheezes, rales, or rhonchi, normal respiratory effort  Abd:  +bowel sounds, soft  Ext:  No pedal edema,  Pedal pulses 2+ B, L 3rd finger at MCP jt is a 0.3 x 0.3cm firm cyst, mildly tender of the palmar side.      Assessment:     1. Ganglion cyst of finger  -discussed diagnosis and options of monitoring, referral to ortho for drainage.  -Pt opts for monitoring, but knows if it worsens she may call/mychart for an ortho referral to Dr. Choudhary.      Return for if symptoms worsen/don't improve.

## 2024-03-12 LAB
.: NORMAL
.: NORMAL

## 2024-04-04 ENCOUNTER — OFFICE VISIT (OUTPATIENT)
Dept: OBGYN CLINIC | Facility: CLINIC | Age: 24
End: 2024-04-04
Payer: COMMERCIAL

## 2024-04-04 VITALS
WEIGHT: 141 LBS | HEART RATE: 67 BPM | HEIGHT: 62 IN | SYSTOLIC BLOOD PRESSURE: 100 MMHG | DIASTOLIC BLOOD PRESSURE: 62 MMHG | BODY MASS INDEX: 25.95 KG/M2

## 2024-04-04 DIAGNOSIS — Z01.812 PRE-PROCEDURAL LABORATORY EXAMINATION: ICD-10-CM

## 2024-04-04 DIAGNOSIS — Z30.017 INSERTION OF IMPLANTABLE SUBDERMAL CONTRACEPTIVE: Primary | ICD-10-CM

## 2024-04-04 LAB
CONTROL LINE PRESENT WITH A CLEAR BACKGROUND (YES/NO): YES YES/NO
KIT LOT #: NORMAL NUMERIC
PREGNANCY TEST, URINE: NEGATIVE

## 2024-04-04 PROCEDURE — 3008F BODY MASS INDEX DOCD: CPT | Performed by: NURSE PRACTITIONER

## 2024-04-04 PROCEDURE — 11981 INSERTION DRUG DLVR IMPLANT: CPT | Performed by: NURSE PRACTITIONER

## 2024-04-04 PROCEDURE — 3078F DIAST BP <80 MM HG: CPT | Performed by: NURSE PRACTITIONER

## 2024-04-04 PROCEDURE — 3074F SYST BP LT 130 MM HG: CPT | Performed by: NURSE PRACTITIONER

## 2024-04-04 PROCEDURE — 81025 URINE PREGNANCY TEST: CPT | Performed by: NURSE PRACTITIONER

## 2024-04-04 NOTE — PROGRESS NOTES
Subjective:  23 year old    Chief Complaint   Patient presents with    Procedure     Nexplanon insertion     Pt here today requesting Nexplanon insertion    Review of Systems:  Pertinent items are noted in the HPI.    Objective:  /62   Pulse 67   Ht 62\"   Wt 141 lb (64 kg)   LMP 2024 (Exact Date)       Physical Examination:  General appearance: Well dressed, well nourished in no apparent distress  Neurologic/Psychiatric: Alert and oriented to person, place and time, mood normal, affect appropriate    Assessment/Plan:      Diagnoses and all orders for this visit:    Insertion of implantable subdermal contraceptive  -     Etonogestrel IMPL 1 each  -     Nexplanon Insertion [62767]    Pre-procedural laboratory examination  -     Urine Preg Test [59032]        Return in about 11 months (around 3/4/2025) for well woman exam or sooner if needed.

## 2024-04-04 NOTE — PROCEDURES
Nexplanon Insertion Procedure Note    Post-Operative Diagnosis and Indication: Desires contraception    Procedure Details:   Urine pregnancy test negative.  LMP 3/22/2024.  Consent signed for Nexplanon. The risks including infection, scarring, bleeding, difficulty with removal, migration, side effects and failure rate were explained to the patient and informed consent obtained.    Procedure performed under sterile conditions with betadine prep.  The patient reported she was right hand dominant. Therefore, decision was made to place the Nexplanon implant on the patient's left arm. Incision site marked 8 cm proximal to the medial epicondyle of the upper left arm, 3 cm posterior to the sulcus between the biceps and triceps muscle, with second hien 5 cm proximal to insertion site, and the insertion site infiltrated with 1% lidocaine solution.  The Nexplanon applicator was then removed from the package. The transparent cap was removed and the applicator was examined to confirm the presence of the Nexplanon device. The Nexplanon applicator was then brought towards the incision and inserted with the tip of the needle at about a 30 degree angle. The Nexplanon applicator was then lowered to a horizontal position and slowly advanced proximal to the insertion site until the full length of needle was noted to be just under the skin. The Nexplanon applicator was then deployed and subsequently removed.  Implant palpated in correct subdermal space immediately after insertion.  Steri-strips, bandaid and wrap bandage applied.  The patient was instructed to keep the pressure bandage in place for 24 hours. The patient tolerated the procedure well.. She was counseled on the recommendation for back up method of contraception for 4-7 days after placement. The patient verbalized understanding.     Condition:  Stable    Complications:  None    Plan:  The patient was advised to call for any fever, redness, bruising, discharge or worsening  pain.  Follow up as needed or for routine gynecologic examination.

## 2024-04-04 NOTE — PATIENT INSTRUCTIONS
[facility] [place], [place] Department of OB/GYN  After Care Instructions for Nexplanon       Bleeding    You may experience irregular bleeding for the first 3-6 months.   If your bleeding becomes heavier than a normal menstrual cycle, please contact our office.        Pain   You may experience mild pain to the arm typically lasting 24-48hrs.  You may use Ibuprofen, Aleve and Tylenol to relieve the discomfort.   If you experience severe or persistent pain contact our office.      Restrictions    A bandage was placed on your arm to cover the site where the Nexplanon was inserted.  Leave the larger bandage on for 12 hours and keep the smaller bandage clean, dry, and in place for 24-48 hours.      When to contact our office   If you are experiencing discomfort described as worse than sore pain to your arm that's not relieved after taking Ibuprofen.  Fever of 100.4 or greater with increase swelling or redness to your arm.  Vaginal bleeding that is saturating 1 pad per hour.      If you have additional questions or concerns, please call us at [phone].

## 2024-11-13 ENCOUNTER — PATIENT MESSAGE (OUTPATIENT)
Dept: OBGYN CLINIC | Facility: CLINIC | Age: 24
End: 2024-11-13

## 2024-11-14 NOTE — TELEPHONE ENCOUNTER
Pt states she has had normal monthly periods since Nexplanon insert 4/4/24. She had her last normal period a month ago & continues to have brown spotting & mild cramping for the past month. Pt is asking if this is to be expected or does she need appointment if this continues?

## 2024-12-03 ENCOUNTER — OFFICE VISIT (OUTPATIENT)
Dept: OBGYN CLINIC | Facility: CLINIC | Age: 24
End: 2024-12-03
Payer: COMMERCIAL

## 2024-12-03 VITALS
BODY MASS INDEX: 29.28 KG/M2 | DIASTOLIC BLOOD PRESSURE: 72 MMHG | HEART RATE: 75 BPM | WEIGHT: 159.13 LBS | SYSTOLIC BLOOD PRESSURE: 118 MMHG | HEIGHT: 62 IN

## 2024-12-03 DIAGNOSIS — R10.2 PELVIC PAIN: ICD-10-CM

## 2024-12-03 DIAGNOSIS — N92.6 IRREGULAR BLEEDING: Primary | ICD-10-CM

## 2024-12-03 PROCEDURE — 99213 OFFICE O/P EST LOW 20 MIN: CPT | Performed by: NURSE PRACTITIONER

## 2024-12-03 PROCEDURE — 3074F SYST BP LT 130 MM HG: CPT | Performed by: NURSE PRACTITIONER

## 2024-12-03 PROCEDURE — 81025 URINE PREGNANCY TEST: CPT | Performed by: NURSE PRACTITIONER

## 2024-12-03 PROCEDURE — 3078F DIAST BP <80 MM HG: CPT | Performed by: NURSE PRACTITIONER

## 2024-12-03 PROCEDURE — 3008F BODY MASS INDEX DOCD: CPT | Performed by: NURSE PRACTITIONER

## 2024-12-03 RX ORDER — ATOGEPANT 60 MG/1
TABLET ORAL
COMMUNITY

## 2024-12-03 NOTE — PROGRESS NOTES
Gyne note       S: patient is a 24 year old yo  here for a recent abnormal bleeding episode.    She had a Nexplanon placed 2024. She continued to have monthly menses after placement. 2024 was her last normal menses.    She started bleeding like a normal menses 10/12/2024. After 4-5 days it tapered to light spotting where she wore a panty liner all day. It would increase slightly for 1 day intermittently. Around  she had a normal menses then it stopped 2 days ago.    She was under increased stress when the abnormal bleeding started due to a loss in the family.    She also began having LLQ/ left pelvic pain the same time as the bleeding. The pain has continued. It feels like a cramps. This pain isn't daily, it is approximately up to twice a week. She has no nausea with the pain. The pain doesn't last long, the longest was a half day    Review of Systems:  General: denies fevers, chills, fatigue and malaise.     O:/72   Pulse 75   Ht 62\"   Wt 159 lb 2 oz (72.2 kg)   LMP 10/12/2024 (Exact Date)   BMI 29.10 kg/m²   Gen NAD   GYNE/: External Genitalia: Normal appearing, no lesions. Urethral meatus appear wnl, no abnormal discharge or lesions noted.                                Vagina: normal pink mucosa, no lesions, normal clear discharge.                      Uterus: AV, mobile, non tender, normal size                     Cervix: nulliparous, no lesions                     Adnexa: non tender, no masses, normal size             A/P:  1. Irregular bleeding  - Urine Preg Test [26882]  - TSH W Reflex To Free T4; Future  - US PELVIS W EV (CPT=76856/81725); Future    2. Pelvic pain  - Urine Preg Test [33656]  - TSH W Reflex To Free T4; Future  - US PELVIS W EV (CPT=76856/02796); Future    Reassured the abnormal bleeding can be normal with the Nexplanon device. She is to call with any prolonged menses like bleeding or heavy bleeding. If her pain becomes unmanageable she is to proceed tot he  ER

## 2025-01-07 ENCOUNTER — HOSPITAL ENCOUNTER (OUTPATIENT)
Dept: ULTRASOUND IMAGING | Age: 25
Discharge: HOME | End: 2025-01-07
Attending: NURSE PRACTITIONER
Payer: COMMERCIAL

## 2025-01-07 DIAGNOSIS — N92.6 IRREGULAR BLEEDING: ICD-10-CM

## 2025-01-07 DIAGNOSIS — R10.2 PELVIC PAIN: ICD-10-CM

## 2025-01-07 PROCEDURE — 76856 US EXAM PELVIC COMPLETE: CPT | Performed by: NURSE PRACTITIONER

## 2025-01-07 PROCEDURE — 76830 TRANSVAGINAL US NON-OB: CPT | Performed by: NURSE PRACTITIONER

## 2025-02-05 ENCOUNTER — OFFICE VISIT (OUTPATIENT)
Dept: FAMILY MEDICINE CLINIC | Facility: CLINIC | Age: 25
End: 2025-02-05
Payer: COMMERCIAL

## 2025-02-05 VITALS
BODY MASS INDEX: 31.28 KG/M2 | SYSTOLIC BLOOD PRESSURE: 112 MMHG | RESPIRATION RATE: 16 BRPM | HEIGHT: 62 IN | WEIGHT: 170 LBS | HEART RATE: 68 BPM | OXYGEN SATURATION: 98 % | TEMPERATURE: 98 F | DIASTOLIC BLOOD PRESSURE: 70 MMHG

## 2025-02-05 DIAGNOSIS — L72.3 INFLAMED SEBACEOUS CYST: Primary | ICD-10-CM

## 2025-02-05 DIAGNOSIS — R22.9 SUBCUTANEOUS NODULE: ICD-10-CM

## 2025-02-05 DIAGNOSIS — Z28.21 INFLUENZA VACCINATION DECLINED BY PATIENT: ICD-10-CM

## 2025-02-05 PROCEDURE — G2211 COMPLEX E/M VISIT ADD ON: HCPCS | Performed by: FAMILY MEDICINE

## 2025-02-05 PROCEDURE — 3078F DIAST BP <80 MM HG: CPT | Performed by: FAMILY MEDICINE

## 2025-02-05 PROCEDURE — 3008F BODY MASS INDEX DOCD: CPT | Performed by: FAMILY MEDICINE

## 2025-02-05 PROCEDURE — 3074F SYST BP LT 130 MM HG: CPT | Performed by: FAMILY MEDICINE

## 2025-02-05 PROCEDURE — 99213 OFFICE O/P EST LOW 20 MIN: CPT | Performed by: FAMILY MEDICINE

## 2025-02-05 NOTE — PROGRESS NOTES
[facility] Visit Note  2/5/2025      Subjective:      Patient ID: Nara Mcclain is a 24 year old female.    Chief Complaint:  Chief Complaint   Patient presents with    Lump     States she has a lump on the  left side of her face/cheek  that has gotten bigger and is tender to the touch. Also a lump on the left side of her abdomen.       HPI:  Nara Mcclain is a 24 year old female who is being seen today for the above.      Lumps-  one is of the left side of her face/cheek  that has gotten bigger since late Jan and is now tender to the touch.     Denies- drainage, discoloration      Also a lump on the left side of her abdomen. Non-tender, no drainage. Just noticed it 1-2 wks ago when putting on lotion. Size of her thumb and above her L hip bone.       Review of Systems - as stated above in the HPI      Objective:     Vitals:    02/05/25 1340   BP: 112/70   Pulse: 68   Resp: 16   Temp: 98.4 °F (36.9 °C)   TempSrc: Temporal   SpO2: 98%   Weight: 170 lb (77.1 kg)   Height: 5' 2\" (1.575 m)       Physical Examination   General:  Alert, in no acute distress  HEENT: NCAT, EOMI, mucus membranes moist   Neck:  No cervical lymphadenopathy  CV: Regular rate and rhythm. No murmurs, gallops, or rubs.  Lungs:  Clear to auscultation B, no wheezes, rales, or rhonchi, normal respiratory effort  Abd:  +bowel sounds, soft  Ext:  No pedal edema,  Pedal pulses 2+ B  SKIN: L side burn area with dilated pore and tender 1 x 0.5cm subcutaneous lump  LLQ subcutaneous lump, mildly tender ~1 x 1 cm, no dilated pore or overriding skin changes.        Assessment:     1. Inflamed sebaceous cyst  - amoxicillin clavulanate 875-125 MG Oral Tab; Take 1 tablet by mouth 2 (two) times daily for 5 days.  Dispense: 10 tablet; Refill: 0  -recommend antibiotic and monitoring. If continues to be bothered by earphones to consider removal.    2. Subcutaneous nodule  -suspect lipoma. To monitor. If continues to grow/not stabilizing, draining, hurting,  etc to let me know and would pursue an US.    3. Influenza vaccination declined by patient  - Fluzone trivalent vaccine, PF 0.5mL, 6mo+ (26112)        I am providing care as part of an ongoing, longitudinal care relationship.    Return for if symptoms worsen/don't improve, annual physical when convenient.

## (undated) NOTE — ED AVS SNAPSHOT
Silvino Alcocer   MRN: [id number]    Department:  [facility] Emergency Department in [place]   Date of Visit:  10/25/2019           Disclosure     Insurance plans vary and the physician(s) referred by the ER may not be covered by your plan.  Please contact tell this physician (or your personal doctor if your instructions are to return to your personal doctor) about any new or lasting problems. The primary care or specialist physician will see patients referred from the [facility] Emergency Department.  Jair Brody

## (undated) NOTE — LETTER
[facility], VIRTUAL VIS[facility]  [facility]  [phone]          09/21/23    Debora Alexandra  4/22/2000        This document is to verify Debora Alexandra was seen for evaluation and treatment of a medical ailment. Please excuse the patient from work for the follow date(s):  9/20/23-9/21/23. She can return on 9/22/23 without restrictions. Please call the number listed above if you have further questions.         Thank you,        Guille Alves PA-C

## (undated) NOTE — ED AVS SNAPSHOT
Natalee Subramanian   MRN: [id number]    Department:  [facility] Emergency Department in [place]   Date of Visit:  11/8/2019           Disclosure     Insurance plans vary and the physician(s) referred by the ER may not be covered by your plan.  Please contact y tell this physician (or your personal doctor if your instructions are to return to your personal doctor) about any new or lasting problems. The primary care or specialist physician will see patients referred from the [facility] Emergency Department.  Shelton Lombard

## (undated) NOTE — LETTER
Date: 8/27/2020    Patient Name: Iain Ramos          To Whom it may concern: This letter has been written at the patient's request. The above patient was seen at the [facility] for treatment of a medical condition.   The patient may retur

## (undated) NOTE — LETTER
Date & Time: 11/8/2019, 10:42 PM  Patient: Gris Rick  Encounter Provider(s):    Heidi Munroe MD       To Whom It May Concern:    Ranulfo Barcenas was seen and treated in our department on 11/8/2019.  Please excuse her father Jonathon Xiao from work tonight

## (undated) NOTE — LETTER
Date: 1/26/2023    Patient Name: Sadiq Ochoa          To Whom it may concern: The above is a patient of [place] [address]. Please excuse her fot the time missed due to illness on 1/24/23-1/26/23.       Sincerely,        Candis Torres MD

## (undated) NOTE — LETTER
Nara Mcclain, :2000    CONSENT FOR PROCEDURE/SEDATION    1. I authorize the performance upon Nara Mcclain  the following: Nexplanon Insertion    2. I authorize CLINTON Cole (and whomever is designated as the doctor’s assistant), to perform the above-mentioned procedures.    3. If any unforeseen conditions arise during this procedure calling for additional  procedures, operations, or medications (including anesthesia and blood transfusion), I further request and authorize the doctor to do whatever he/she deems advisable in my interest.    4. I consent to the taking and reproduction of any photographs in the course of this procedure for professional purposes.    5. I consent to the administration of such sedation as may be considered necessary or advisable by the physician responsible for this service, with the exception of ______________________________________________________    6. I have been informed by my doctor of the nature and purpose of this procedure sedation, possible alternative methods of treatment, risk involved and possible complications.    7. If I have a Do Not Resuscitate (DNR) order in place, the physician and I (or the individual authorized to consent on my behalf) will discuss and agree as to whether the Do Not Resuscitate (DNR) order will remain in effect or will be discontinued during the performance of the procedure and the applicable recovery period. If the Do Not Resuscitate (DNR) order is discontinued and is to be reinstated following the procedure/recovery period, the physician will determine when the applicable recovery period ends for purposes of reinstating the Do Not Resuscitate (DNR) order.    Signature of Patient:_______________________________________________    Signature of person authorized to consent for patient:  _______________________________________________________________    Relationship to patient: ____________________________________________    Witness:  _________________________________________ Date:___________     Physician Signature: _______________________________ Date:___________

## (undated) NOTE — LETTER
Date: 1/19/2024    Patient Name: Nara Mcclain          To Whom it may concern:    The above is a patient of [facility]. Please excuse Nara for the time missed today as she was seen in our office this morning.          Sincerely,        Maame Jefferson MD

## (undated) NOTE — LETTER
Date: 1/7/2021    Patient Name: Alethea Sutton          To Whom it may concern: The above is a patient of [place] [address]. She has ended her quarantine and is able to return to work.       Sincerely,        Palomo Murray MD